# Patient Record
Sex: FEMALE | Race: ASIAN | NOT HISPANIC OR LATINO
[De-identification: names, ages, dates, MRNs, and addresses within clinical notes are randomized per-mention and may not be internally consistent; named-entity substitution may affect disease eponyms.]

---

## 2019-07-30 PROBLEM — Z00.00 ENCOUNTER FOR PREVENTIVE HEALTH EXAMINATION: Status: ACTIVE | Noted: 2019-07-30

## 2019-08-02 ENCOUNTER — APPOINTMENT (OUTPATIENT)
Dept: ORTHOPEDIC SURGERY | Facility: CLINIC | Age: 19
End: 2019-08-02
Payer: MEDICARE

## 2019-08-02 DIAGNOSIS — M25.572 PAIN IN LEFT ANKLE AND JOINTS OF LEFT FOOT: ICD-10-CM

## 2019-08-02 PROCEDURE — 99204 OFFICE O/P NEW MOD 45 MIN: CPT

## 2019-08-02 NOTE — HISTORY OF PRESENT ILLNESS
[FreeTextEntry1] : Location: left ankle/achilles\par Quality: dull, sharp occasionally\par Duration:07/17/2019\par Context: atraumatic\par Aggravating Factors: walking, weightbearing \par Conservative treatment: brace\par Associated Symptoms: stiffness\par Prior Studies: 07/26/2019 [facility]\par

## 2019-08-02 NOTE — REVIEW OF SYSTEMS
[Arthralgia] : arthralgia [Joint Pain] : no joint pain [Joint Stiffness] : no joint stiffness [Joint Swelling] : no joint swelling [Negative] : Heme/Lymph

## 2019-08-02 NOTE — DISCUSSION/SUMMARY
[de-identified] : This patient will rest from running she can ride a bike and use an elliptical machine. She will avoid squats and lunges. She will give this time to get better by itself on its own. She will stretch. Ice and heat were discussed. If there is no improvement in physical therapy would be initiated.